# Patient Record
Sex: MALE | Race: OTHER | HISPANIC OR LATINO | URBAN - METROPOLITAN AREA
[De-identification: names, ages, dates, MRNs, and addresses within clinical notes are randomized per-mention and may not be internally consistent; named-entity substitution may affect disease eponyms.]

---

## 2022-05-17 ENCOUNTER — HOSPITAL ENCOUNTER (EMERGENCY)
Facility: OTHER | Age: 47
Discharge: HOME | End: 2022-05-17
Attending: EMERGENCY MEDICINE
Payer: COMMERCIAL

## 2022-05-17 VITALS
TEMPERATURE: 99 F | WEIGHT: 170 LBS | HEART RATE: 56 BPM | HEIGHT: 68 IN | RESPIRATION RATE: 16 BRPM | BODY MASS INDEX: 25.76 KG/M2 | OXYGEN SATURATION: 98 % | DIASTOLIC BLOOD PRESSURE: 77 MMHG | SYSTOLIC BLOOD PRESSURE: 139 MMHG

## 2022-05-17 DIAGNOSIS — N23 RENAL COLIC ON RIGHT SIDE: Primary | ICD-10-CM

## 2022-05-17 DIAGNOSIS — N20.0 KIDNEY STONE: ICD-10-CM

## 2022-05-17 LAB
ALBUMIN SERPL BCP-MCNC: 4.5 G/DL (ref 3.5–5.2)
ALP SERPL-CCNC: 79 U/L (ref 55–135)
ALT SERPL W/O P-5'-P-CCNC: 25 U/L (ref 10–44)
ANION GAP SERPL CALC-SCNC: 12 MMOL/L (ref 8–16)
AST SERPL-CCNC: 18 U/L (ref 10–40)
BASOPHILS # BLD AUTO: 0.05 K/UL (ref 0–0.2)
BASOPHILS NFR BLD: 0.2 % (ref 0–1.9)
BILIRUB SERPL-MCNC: 0.3 MG/DL (ref 0.1–1)
BILIRUB UR QL STRIP: NEGATIVE
BUN SERPL-MCNC: 17 MG/DL (ref 6–20)
CALCIUM SERPL-MCNC: 9.3 MG/DL (ref 8.7–10.5)
CHLORIDE SERPL-SCNC: 103 MMOL/L (ref 95–110)
CLARITY UR: CLEAR
CO2 SERPL-SCNC: 24 MMOL/L (ref 23–29)
COLOR UR: YELLOW
CREAT SERPL-MCNC: 1.3 MG/DL (ref 0.5–1.4)
DIFFERENTIAL METHOD: ABNORMAL
EOSINOPHIL # BLD AUTO: 0 K/UL (ref 0–0.5)
EOSINOPHIL NFR BLD: 0.1 % (ref 0–8)
ERYTHROCYTE [DISTWIDTH] IN BLOOD BY AUTOMATED COUNT: 13 % (ref 11.5–14.5)
EST. GFR  (AFRICAN AMERICAN): >60 ML/MIN/1.73 M^2
EST. GFR  (NON AFRICAN AMERICAN): >60 ML/MIN/1.73 M^2
GLUCOSE SERPL-MCNC: 155 MG/DL (ref 70–110)
GLUCOSE UR QL STRIP: ABNORMAL
HCT VFR BLD AUTO: 43.5 % (ref 40–54)
HGB BLD-MCNC: 14.9 G/DL (ref 14–18)
HGB UR QL STRIP: ABNORMAL
IMM GRANULOCYTES # BLD AUTO: 0.12 K/UL (ref 0–0.04)
IMM GRANULOCYTES NFR BLD AUTO: 0.6 % (ref 0–0.5)
KETONES UR QL STRIP: NEGATIVE
LEUKOCYTE ESTERASE UR QL STRIP: NEGATIVE
LYMPHOCYTES # BLD AUTO: 1.8 K/UL (ref 1–4.8)
LYMPHOCYTES NFR BLD: 8.6 % (ref 18–48)
MCH RBC QN AUTO: 30.9 PG (ref 27–31)
MCHC RBC AUTO-ENTMCNC: 34.3 G/DL (ref 32–36)
MCV RBC AUTO: 90 FL (ref 82–98)
MONOCYTES # BLD AUTO: 1 K/UL (ref 0.3–1)
MONOCYTES NFR BLD: 4.8 % (ref 4–15)
NEUTROPHILS # BLD AUTO: 17.9 K/UL (ref 1.8–7.7)
NEUTROPHILS NFR BLD: 85.7 % (ref 38–73)
NITRITE UR QL STRIP: NEGATIVE
NRBC BLD-RTO: 0 /100 WBC
PH UR STRIP: 6 [PH] (ref 5–8)
PLATELET # BLD AUTO: 275 K/UL (ref 150–450)
PMV BLD AUTO: 9.4 FL (ref 9.2–12.9)
POTASSIUM SERPL-SCNC: 3.6 MMOL/L (ref 3.5–5.1)
PROT SERPL-MCNC: 7.3 G/DL (ref 6–8.4)
PROT UR QL STRIP: ABNORMAL
RBC # BLD AUTO: 4.82 M/UL (ref 4.6–6.2)
SODIUM SERPL-SCNC: 139 MMOL/L (ref 136–145)
SP GR UR STRIP: >=1.03 (ref 1–1.03)
URN SPEC COLLECT METH UR: ABNORMAL
UROBILINOGEN UR STRIP-ACNC: NEGATIVE EU/DL
WBC # BLD AUTO: 20.89 K/UL (ref 3.9–12.7)

## 2022-05-17 PROCEDURE — 96372 THER/PROPH/DIAG INJ SC/IM: CPT | Performed by: EMERGENCY MEDICINE

## 2022-05-17 PROCEDURE — 63600175 PHARM REV CODE 636 W HCPCS: Performed by: EMERGENCY MEDICINE

## 2022-05-17 PROCEDURE — 80053 COMPREHEN METABOLIC PANEL: CPT | Performed by: PHYSICIAN ASSISTANT

## 2022-05-17 PROCEDURE — 85025 COMPLETE CBC W/AUTO DIFF WBC: CPT | Performed by: PHYSICIAN ASSISTANT

## 2022-05-17 PROCEDURE — 99284 EMERGENCY DEPT VISIT MOD MDM: CPT | Mod: 25

## 2022-05-17 PROCEDURE — 81003 URINALYSIS AUTO W/O SCOPE: CPT | Performed by: PHYSICIAN ASSISTANT

## 2022-05-17 RX ORDER — KETOROLAC TROMETHAMINE 30 MG/ML
15 INJECTION, SOLUTION INTRAMUSCULAR; INTRAVENOUS
Status: DISCONTINUED | OUTPATIENT
Start: 2022-05-17 | End: 2022-05-17

## 2022-05-17 RX ORDER — ONDANSETRON 4 MG/1
4 TABLET, ORALLY DISINTEGRATING ORAL EVERY 8 HOURS PRN
Qty: 12 TABLET | Refills: 0 | Status: SHIPPED | OUTPATIENT
Start: 2022-05-17

## 2022-05-17 RX ORDER — KETOROLAC TROMETHAMINE 30 MG/ML
15 INJECTION, SOLUTION INTRAMUSCULAR; INTRAVENOUS
Status: COMPLETED | OUTPATIENT
Start: 2022-05-17 | End: 2022-05-17

## 2022-05-17 RX ORDER — HYDROCODONE BITARTRATE AND ACETAMINOPHEN 5; 325 MG/1; MG/1
1 TABLET ORAL EVERY 6 HOURS PRN
Qty: 12 TABLET | Refills: 0 | Status: SHIPPED | OUTPATIENT
Start: 2022-05-17

## 2022-05-17 RX ADMIN — KETOROLAC TROMETHAMINE 15 MG: 30 INJECTION, SOLUTION INTRAMUSCULAR at 01:05

## 2022-05-17 NOTE — ED TRIAGE NOTES
46 y/o male presents to ED with c/o RL back pain radiating to RL abdomen, describes pain as a pressure. Rating 10/10. Symptoms started this morning.    Sacral nerve stimulator

## 2022-05-17 NOTE — FIRST PROVIDER EVALUATION
Emergency Department TeleTriage Encounter Note      CHIEF COMPLAINT    Chief Complaint   Patient presents with    Back Pain     Pt c.o right lower back pain onset this AM while going to work. Pt states radiates around to abd.  Pt states he felt like he was going to pass out ealier when pain was intense.  AAO x 3 nadn skin cool dry.  No hx of kidney stone       VITAL SIGNS   Initial Vitals [05/17/22 1034]   BP Pulse Resp Temp SpO2   (!) 137/90 (!) 59 18 97.3 °F (36.3 °C) 97 %      MAP       --            ALLERGIES    Review of patient's allergies indicates:  No Known Allergies    PROVIDER TRIAGE NOTE  This is a teletriage evaluation of a 47 y.o. male presenting to the ED complaining of flank pain. Patient reports sudden onset intense right sided back and flank pain this morning. No history of kidney stones. He denies hematuria or dysuria, but reports urgency and frequency with little urine. He denies taking any medications PTA.     Initial orders will be placed and care will be transferred to an alternate provider when patient is roomed for a full evaluation. Any additional orders and the final disposition will be determined by that provider.           ORDERS  Labs Reviewed   CBC W/ AUTO DIFFERENTIAL   COMPREHENSIVE METABOLIC PANEL   URINALYSIS, REFLEX TO URINE CULTURE       ED Orders (720h ago, onward)    Start Ordered     Status Ordering Provider    05/17/22 1045 05/17/22 1041  ketorolac injection 15 mg  ED 1 Time         Ordered USMANSRI G.    05/17/22 1041 05/17/22 1041  Saline lock IV  Once         Ordered USMANSRI G.    05/17/22 1041 05/17/22 1041  CBC auto differential  STAT         Ordered USMAN, SRI G.    05/17/22 1041 05/17/22 1041  Comprehensive metabolic panel  STAT         Ordered USMAN, SRI G.    05/17/22 1041 05/17/22 1041  Urinalysis, Reflex to Urine Culture Urine, Clean Catch  STAT         Ordered USMAN, SRI G.    05/17/22 1041 05/17/22 1041  CT Renal Stone Study ABD Pelvis WO  1 time  imaging         Ordered SRI MARY            Virtual Visit Note: The provider triage portion of this emergency department evaluation and documentation was performed via [employer], a HIPAA-compliant telemedicine application, in concert with a tele-presenter in the room. A face to face patient evaluation with one of my colleagues will occur once the patient is placed in an emergency department room.      DISCLAIMER: This note was prepared with [employer] voice recognition transcription software. Garbled syntax, mangled pronouns, and other bizarre constructions may be attributed to that software system.
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
## 2022-06-07 ENCOUNTER — OFFICE VISIT (OUTPATIENT)
Dept: UROLOGY | Facility: CLINIC | Age: 47
End: 2022-06-07
Payer: COMMERCIAL

## 2022-06-07 DIAGNOSIS — N20.0 KIDNEY STONES: Primary | ICD-10-CM

## 2022-06-07 PROCEDURE — 82365 CALCULUS SPECTROSCOPY: CPT | Performed by: UROLOGY

## 2022-06-07 PROCEDURE — 1159F PR MEDICATION LIST DOCUMENTED IN MEDICAL RECORD: ICD-10-PCS | Mod: CPTII,S$GLB,, | Performed by: UROLOGY

## 2022-06-07 PROCEDURE — 99203 PR OFFICE/OUTPT VISIT, NEW, LEVL III, 30-44 MIN: ICD-10-PCS | Mod: S$GLB,,, | Performed by: UROLOGY

## 2022-06-07 PROCEDURE — 1160F RVW MEDS BY RX/DR IN RCRD: CPT | Mod: CPTII,S$GLB,, | Performed by: UROLOGY

## 2022-06-07 PROCEDURE — 99999 PR PBB SHADOW E&M-EST. PATIENT-LVL III: CPT | Mod: PBBFAC,,, | Performed by: UROLOGY

## 2022-06-07 PROCEDURE — 99999 PR PBB SHADOW E&M-EST. PATIENT-LVL III: ICD-10-PCS | Mod: PBBFAC,,, | Performed by: UROLOGY

## 2022-06-07 PROCEDURE — 1159F MED LIST DOCD IN RCRD: CPT | Mod: CPTII,S$GLB,, | Performed by: UROLOGY

## 2022-06-07 PROCEDURE — 99203 OFFICE O/P NEW LOW 30 MIN: CPT | Mod: S$GLB,,, | Performed by: UROLOGY

## 2022-06-07 PROCEDURE — 1160F PR REVIEW ALL MEDS BY PRESCRIBER/CLIN PHARMACIST DOCUMENTED: ICD-10-PCS | Mod: CPTII,S$GLB,, | Performed by: UROLOGY

## 2022-06-07 NOTE — PROGRESS NOTES
Subjective:      Patient ID: Charlie Cartwright is a 47 y.o. male.    Chief Complaint: kidney stones.    Patient is a 47 y.o. male who is new to our clinic and referred by Murray Pascal NP for evaluation of kidney stones.     HPI    Urolithiasis  Patient complained of right sided flank pain.  Onset 3 weeks ago.  Now resolved.  Passed a kidney stone which he brings in today.   He has no prior history of kidney stones.  n  Urinalysis at the time of ER visit showed no leuks, nitrite neg.     Review of Systems   All other systems reviewed and negative except pertinent positives noted in HPI.    Objective:     Physical Exam  Constitutional:       General: He is not in acute distress.     Appearance: He is well-developed.   HENT:      Head: Normocephalic and atraumatic.   Eyes:      General: No scleral icterus.  Neck:      Trachea: No tracheal deviation.   Pulmonary:      Effort: Pulmonary effort is normal. No respiratory distress.   Neurological:      Mental Status: He is alert and oriented to person, place, and time.   Psychiatric:         Behavior: Behavior normal.         Thought Content: Thought content normal.         Judgment: Judgment normal.       Assessment:     1. Kidney stones      Plan:     1. Kidney stones        Orders Placed This Encounter   Procedures    Urinary Stone Analysis     Order Specific Question:   Specimen Source     Answer:   Stone     1. Kidney stones:  -General risk factors for kidney stones and the conservative measures to prevent kidney stones in the future were discussed with the patient in detail.  The patient was encouraged to drink 2-3 liters of water a day, limit iced tea and violeta as well as foods high in oxalate.  They were cautioned to try to limit salt and red meat intake.  We also discussed adding citrate to the diet with the addition of tenzin or lemon juice to their water or alternatively with crystal light.   -CT scan was independently reviewed today and reveals 4 small right renal  stone, right hydronephrosis, 2mm right UVJ stone.       -the patient was extensively counseled on options for management of his residual nonobstructing right renal stones.  These include observation, shockwave lithotripsy, or ureteroscopy.  I explained that shockwave lithotripsy would not likely makes sense given that he has numerous very small right renal stones.  However, if the patient were interested in a preemptive treatment for these kidney stones, ureteroscopy would be a reasonable option.  He would like to consider his options and will notify us if he decides to proceed.    -stone for chemical analysis ordered today.

## 2022-06-11 LAB
COMPN STONE: NORMAL
SPECIMEN SOURCE: NORMAL
STONE ANALYSIS IR-IMP: NORMAL

## 2024-08-11 ENCOUNTER — HOSPITAL ENCOUNTER (EMERGENCY)
Facility: HOSPITAL | Age: 49
Discharge: HOME | End: 2024-08-11
Attending: EMERGENCY MEDICINE
Payer: COMMERCIAL

## 2024-08-11 VITALS
TEMPERATURE: 98 F | BODY MASS INDEX: 25.61 KG/M2 | HEIGHT: 68 IN | WEIGHT: 169 LBS | HEART RATE: 60 BPM | SYSTOLIC BLOOD PRESSURE: 143 MMHG | RESPIRATION RATE: 16 BRPM | OXYGEN SATURATION: 98 % | DIASTOLIC BLOOD PRESSURE: 88 MMHG

## 2024-08-11 DIAGNOSIS — R31.9 URINARY TRACT INFECTION WITH HEMATURIA, SITE UNSPECIFIED: Primary | ICD-10-CM

## 2024-08-11 DIAGNOSIS — N20.1 RIGHT URETERAL STONE: ICD-10-CM

## 2024-08-11 DIAGNOSIS — N39.0 URINARY TRACT INFECTION WITH HEMATURIA, SITE UNSPECIFIED: Primary | ICD-10-CM

## 2024-08-11 PROBLEM — N20.9 UROLITHIASIS: Status: ACTIVE | Noted: 2024-08-11

## 2024-08-11 LAB
ALBUMIN SERPL BCP-MCNC: 3.6 G/DL (ref 3.5–5.2)
ALP SERPL-CCNC: 114 U/L (ref 55–135)
ALT SERPL W/O P-5'-P-CCNC: 54 U/L (ref 10–44)
ANION GAP SERPL CALC-SCNC: 10 MMOL/L (ref 8–16)
AST SERPL-CCNC: 25 U/L (ref 10–40)
BASOPHILS # BLD AUTO: 0.04 K/UL (ref 0–0.2)
BASOPHILS NFR BLD: 0.5 % (ref 0–1.9)
BILIRUB SERPL-MCNC: 0.3 MG/DL (ref 0.1–1)
BILIRUB UR QL STRIP: NEGATIVE
BUN SERPL-MCNC: 16 MG/DL (ref 6–20)
CALCIUM SERPL-MCNC: 10.1 MG/DL (ref 8.7–10.5)
CHLORIDE SERPL-SCNC: 105 MMOL/L (ref 95–110)
CLARITY UR REFRACT.AUTO: CLEAR
CO2 SERPL-SCNC: 26 MMOL/L (ref 23–29)
COLOR UR AUTO: YELLOW
CREAT SERPL-MCNC: 1 MG/DL (ref 0.5–1.4)
DIFFERENTIAL METHOD BLD: ABNORMAL
EOSINOPHIL # BLD AUTO: 0.1 K/UL (ref 0–0.5)
EOSINOPHIL NFR BLD: 1.1 % (ref 0–8)
ERYTHROCYTE [DISTWIDTH] IN BLOOD BY AUTOMATED COUNT: 12.9 % (ref 11.5–14.5)
EST. GFR  (NO RACE VARIABLE): >60 ML/MIN/1.73 M^2
GLUCOSE SERPL-MCNC: 95 MG/DL (ref 70–110)
GLUCOSE UR QL STRIP: NEGATIVE
HCT VFR BLD AUTO: 41.5 % (ref 40–54)
HCV AB SERPL QL IA: NORMAL
HGB BLD-MCNC: 13.4 G/DL (ref 14–18)
HGB UR QL STRIP: ABNORMAL
HIV 1+2 AB+HIV1 P24 AG SERPL QL IA: NORMAL
IMM GRANULOCYTES # BLD AUTO: 0.05 K/UL (ref 0–0.04)
IMM GRANULOCYTES NFR BLD AUTO: 0.6 % (ref 0–0.5)
KETONES UR QL STRIP: NEGATIVE
LEUKOCYTE ESTERASE UR QL STRIP: ABNORMAL
LYMPHOCYTES # BLD AUTO: 1.6 K/UL (ref 1–4.8)
LYMPHOCYTES NFR BLD: 18.9 % (ref 18–48)
MCH RBC QN AUTO: 28.9 PG (ref 27–31)
MCHC RBC AUTO-ENTMCNC: 32.3 G/DL (ref 32–36)
MCV RBC AUTO: 90 FL (ref 82–98)
MICROSCOPIC COMMENT: ABNORMAL
MONOCYTES # BLD AUTO: 0.7 K/UL (ref 0.3–1)
MONOCYTES NFR BLD: 8.4 % (ref 4–15)
NEUTROPHILS # BLD AUTO: 6 K/UL (ref 1.8–7.7)
NEUTROPHILS NFR BLD: 70.5 % (ref 38–73)
NITRITE UR QL STRIP: NEGATIVE
NRBC BLD-RTO: 0 /100 WBC
PH UR STRIP: 5 [PH] (ref 5–8)
PLATELET # BLD AUTO: 267 K/UL (ref 150–450)
PMV BLD AUTO: 8.8 FL (ref 9.2–12.9)
POTASSIUM SERPL-SCNC: 4.1 MMOL/L (ref 3.5–5.1)
PROT SERPL-MCNC: 7.6 G/DL (ref 6–8.4)
PROT UR QL STRIP: NEGATIVE
RBC # BLD AUTO: 4.63 M/UL (ref 4.6–6.2)
RBC #/AREA URNS AUTO: 16 /HPF (ref 0–4)
SODIUM SERPL-SCNC: 141 MMOL/L (ref 136–145)
SP GR UR STRIP: 1.01 (ref 1–1.03)
URN SPEC COLLECT METH UR: ABNORMAL
WBC # BLD AUTO: 8.54 K/UL (ref 3.9–12.7)
WBC #/AREA URNS AUTO: 61 /HPF (ref 0–5)
WBC CLUMPS UR QL AUTO: ABNORMAL

## 2024-08-11 PROCEDURE — 87389 HIV-1 AG W/HIV-1&-2 AB AG IA: CPT | Performed by: PHYSICIAN ASSISTANT

## 2024-08-11 PROCEDURE — 96365 THER/PROPH/DIAG IV INF INIT: CPT

## 2024-08-11 PROCEDURE — 86803 HEPATITIS C AB TEST: CPT | Performed by: PHYSICIAN ASSISTANT

## 2024-08-11 PROCEDURE — 87086 URINE CULTURE/COLONY COUNT: CPT | Performed by: EMERGENCY MEDICINE

## 2024-08-11 PROCEDURE — 81001 URINALYSIS AUTO W/SCOPE: CPT | Performed by: EMERGENCY MEDICINE

## 2024-08-11 PROCEDURE — 85025 COMPLETE CBC W/AUTO DIFF WBC: CPT | Performed by: EMERGENCY MEDICINE

## 2024-08-11 PROCEDURE — 87186 SC STD MICRODIL/AGAR DIL: CPT | Performed by: EMERGENCY MEDICINE

## 2024-08-11 PROCEDURE — 99284 EMERGENCY DEPT VISIT MOD MDM: CPT | Mod: ,,, | Performed by: UROLOGY

## 2024-08-11 PROCEDURE — 25000003 PHARM REV CODE 250: Performed by: EMERGENCY MEDICINE

## 2024-08-11 PROCEDURE — 80053 COMPREHEN METABOLIC PANEL: CPT | Performed by: EMERGENCY MEDICINE

## 2024-08-11 PROCEDURE — 87088 URINE BACTERIA CULTURE: CPT | Performed by: EMERGENCY MEDICINE

## 2024-08-11 PROCEDURE — 99285 EMERGENCY DEPT VISIT HI MDM: CPT | Mod: 25

## 2024-08-11 PROCEDURE — 63600175 PHARM REV CODE 636 W HCPCS: Performed by: EMERGENCY MEDICINE

## 2024-08-11 RX ORDER — ACETAMINOPHEN 500 MG
1000 TABLET ORAL
Status: COMPLETED | OUTPATIENT
Start: 2024-08-11 | End: 2024-08-11

## 2024-08-11 RX ORDER — OXYCODONE AND ACETAMINOPHEN 5; 325 MG/1; MG/1
1 TABLET ORAL EVERY 6 HOURS PRN
Qty: 12 TABLET | Refills: 0 | Status: SHIPPED | OUTPATIENT
Start: 2024-08-11

## 2024-08-11 RX ORDER — CIPROFLOXACIN 500 MG/1
500 TABLET ORAL 2 TIMES DAILY
Qty: 14 TABLET | Refills: 0 | Status: SHIPPED | OUTPATIENT
Start: 2024-08-11 | End: 2024-08-18

## 2024-08-11 RX ORDER — TAMSULOSIN HYDROCHLORIDE 0.4 MG/1
0.4 CAPSULE ORAL DAILY
Qty: 10 CAPSULE | Refills: 0 | Status: SHIPPED | OUTPATIENT
Start: 2024-08-11 | End: 2025-08-11

## 2024-08-11 RX ADMIN — CEFTRIAXONE SODIUM 2 G: 2 INJECTION, POWDER, FOR SOLUTION INTRAMUSCULAR; INTRAVENOUS at 12:08

## 2024-08-11 RX ADMIN — ACETAMINOPHEN 1000 MG: 500 TABLET ORAL at 11:08

## 2024-08-11 NOTE — HPI
49-year-old male history of kidney stones, presented to the ED with right flank pain for 5 days. CT scan was performed and showed a 3 mm stone at the right UVJ. Urology was consulted for evaluation of ureteral stone.    On assesment the patient is AFVSS. States that the flank pain began 5 days ago. Has had several episodes of intermittent night sweats at home that he attributes to the blankets his wife puts on the bed. Fevers of 101 at home 1 week prior, before the beginning of flank pain. Denies chills, hematuria, nausea, vomiting. Previous passed a calcium oxylate stone in June of 2022.     WBC 8.52, Hg 13.4. Cr 1.0 consistent with baseline. UA nitrite negative, 16 RBC, 61 WBCs and no bacteria.     CT scan showed mild right hydronephrosis with 3 mm lower pole stone, 3 mm stone at the right UVJ. No stones or hydro on the left.

## 2024-08-11 NOTE — CONSULTS
[facility] - Emergency Dept  Urology  Consult Note    Patient Name: Charlie Cartwright  MRN: [id number]  Admission Date: 8/11/2024  Hospital Length of Stay: 0   Code Status: No Order   Attending Provider: Judy Kellogg MD   Consulting Provider: Rafael Lentz DO  Primary Care Physician: No, Primary Doctor  Principal Problem:<principal problem not specified>    Inpatient consult to Urology  Consult performed by: Rafael Lentz DO  Consult ordered by: Judy Kellogg MD  Reason for consult: UVJ stone          Subjective:     HPI:  49-year-old male history of kidney stones, presented to the ED with right flank pain for 5 days. CT scan was performed and showed a 3 mm stone at the right UVJ. Urology was consulted for evaluation of ureteral stone.    On assesment the patient is AFVSS. States that the flank pain began 5 days ago. He does have night sweats at home. Fevers of 101 at home 1 week prior, before the beginning of flank pain. Denies chills, hematuria, nausea, vomiting. Previous passed a calcium oxylate stone in June of 2022.     WBC 8.52, Hg 13.4. Cr 1.0 consistent with baseline. UA nitrite negative, 16 RBC, 61 WBCs and no bacteria.     CT scan showed mild right hydronephrosis with 3 mm lower pole stone, 3 mm stone at the right UVJ. No stones or hydro on the left.     No new subjective & objective note has been filed under this hospital service since the last note was generated.    ROS positive for previous fever and flank pain.       Physical Exam  Constitutional:       General: He is not in acute distress.  Cardiovascular:      Rate and Rhythm: Normal rate.   Pulmonary:      Effort: No respiratory distress.   Abdominal:      Tenderness: There is no right CVA tenderness or left CVA tenderness.          Assessment and Plan:     Urolithiasis  49-year-old male history of kidney stones, presented to the ED with right flank pain for 5 days. CT scan was performed and showed a 3 mm stone at the right UVJ. Urology was  consulted for evaluation of ureteral stone.    -- Patient wishes to proceed with trial of passage  -- Please discharge on Flomax, Toradol, PRN Zofran  -- Please strain all urine.   -- UA non concerning for infection. Will f/u culture results.    -- Will arrange 2 week follow up with [facility] prior to monitor stone  -- Patient given strict return to ED precautions including fevers, chills, uncontrolled nausea, vomiting and pain  -- 1 week of prophylactic Cipro   -- Okay for discharge from Urologic perspective              VTE Risk Mitigation (From admission, onward)      None            Thank you for your consult. I will sign off. Please contact us if you have any additional questions.    Rafael Lentz, DO  Urology  [facility] - Emergency Dept

## 2024-08-11 NOTE — ASSESSMENT & PLAN NOTE
49-year-old male history of kidney stones, presented to the ED with right flank pain for 5 days. CT scan was performed and showed a 3 mm stone at the right UVJ. Urology was consulted for evaluation of ureteral stone.    -- Patient wishes to proceed with trial of passage  -- Please discharge on Flomax, Toradol, PRN Zofran  -- Please strain all urine.   -- UA non concerning for infection.   -- Will give prophylactic Cipro for 1 week   -- Will arrange 2 week follow up with [facility] prior to monitor stone  -- Okay for discharge from Urologic perspective

## 2024-08-11 NOTE — ED TRIAGE NOTES
Pt presents to the ED c/o right sided flank pain since Tuesday, worsening this am. Denies hematuria. Hx kidney stone. +urinary frequency

## 2024-08-11 NOTE — ASSESSMENT & PLAN NOTE
49-year-old male history of kidney stones, presented to the ED with right flank pain for 5 days. CT scan was performed and showed a 3 mm stone at the right UVJ. Urology was consulted for evaluation of ureteral stone.    -- Patient wishes to proceed with trial of passage  -- Please discharge on Flomax, Toradol, PRN Zofran  -- Please strain all urine.   -- UA non concerning for infection.   -- Will arrange 2 week follow up with [facility] prior to monitor stone  -- Okay for discharge from Urologic perspective

## 2024-08-11 NOTE — ED PROVIDER NOTES
Encounter Date: 8/11/2024       History     Chief Complaint   Patient presents with    Flank Pain     R side, hx kidney stones     49-year-old male history of kidney stones, presenting with right flank pain for 5 days.  Pain is nonradiating.  Was more severe earlier in the week and it had improved over the last couple of days and patient thought that he was over it but then today he noticed that his urine was cloudy and was worried that he has an infection.  He has had no fevers or chills but he has had night sweats.  Pain is also still present though mild in his right flank.  It does not radiate to his groin.    The history is provided by the patient.     Review of patient's allergies indicates:   Allergen Reactions    Guaifenesin Other (See Comments)     History reviewed. No pertinent past medical history.  Past Surgical History:   Procedure Laterality Date    HAND SURGERY Right      No family history on file.  Social History     Tobacco Use    Smoking status: Never    Smokeless tobacco: Never   Substance Use Topics    Alcohol use: Never    Drug use: Never     Review of Systems    Physical Exam     Initial Vitals [08/11/24 0924]   BP Pulse Resp Temp SpO2   (!) 143/95 82 18 97.8 °F (36.6 °C) 100 %      MAP       --         Physical Exam    Nursing note and vitals reviewed.  Constitutional: Vital signs are normal. He appears well-developed and well-nourished. He is not diaphoretic.  Non-toxic appearance. He does not appear ill. No distress.   HENT:   Head: Normocephalic and atraumatic.   Mouth/Throat: Mucous membranes are normal. Mucous membranes are not dry.   Eyes: Conjunctivae and lids are normal.   Neck: Neck supple.   Normal range of motion.  Cardiovascular:  Normal rate.           Pulmonary/Chest: No respiratory distress.   Abdominal: Abdomen is soft. He exhibits no distension. There is no abdominal tenderness. There is no rebound and no guarding.   Musculoskeletal:         General: No edema.      Cervical  back: Normal range of motion and neck supple.     Neurological: He is alert and oriented to person, place, and time.   Skin: Skin is dry and intact. No pallor.   Psychiatric: He has a normal mood and affect. His speech is normal and behavior is normal.         ED Course   Procedures  Labs Reviewed   URINALYSIS, REFLEX TO URINE CULTURE - Abnormal       Result Value    Specimen UA Urine, Clean Catch      Color, UA Yellow      Appearance, UA Clear      pH, UA 5.0      Specific Gravity, UA 1.015      Protein, UA Negative      Glucose, UA Negative      Ketones, UA Negative      Bilirubin (UA) Negative      Occult Blood UA Trace (*)     Nitrite, UA Negative      Leukocytes, UA 3+ (*)     Narrative:     Specimen Source->Urine   CBC W/ AUTO DIFFERENTIAL - Abnormal    WBC 8.54      RBC 4.63      Hemoglobin 13.4 (*)     Hematocrit 41.5      MCV 90      MCH 28.9      MCHC 32.3      RDW 12.9      Platelets 267      MPV 8.8 (*)     Immature Granulocytes 0.6 (*)     Gran # (ANC) 6.0      Immature Grans (Abs) 0.05 (*)     Lymph # 1.6      Mono # 0.7      Eos # 0.1      Baso # 0.04      nRBC 0      Gran % 70.5      Lymph % 18.9      Mono % 8.4      Eosinophil % 1.1      Basophil % 0.5      Differential Method Automated     COMPREHENSIVE METABOLIC PANEL - Abnormal    Sodium 141      Potassium 4.1      Chloride 105      CO2 26      Glucose 95      BUN 16      Creatinine 1.0      Calcium 10.1      Total Protein 7.6      Albumin 3.6      Total Bilirubin 0.3      Alkaline Phosphatase 114      AST 25      ALT 54 (*)     eGFR >60.0      Anion Gap 10     URINALYSIS MICROSCOPIC - Abnormal    RBC, UA 16 (*)     WBC, UA 61 (*)     WBC Clumps, UA Rare      Microscopic Comment SEE COMMENT      Narrative:     Specimen Source->Urine   CULTURE, URINE   HIV 1 / 2 ANTIBODY    HIV 1/2 Ag/Ab Non-reactive      Narrative:     Release to patient->Immediate   HEPATITIS C ANTIBODY    Hepatitis C Ab Non-reactive      Narrative:     Release to  patient->Immediate          Imaging Results               CT Renal Stone Study ABD Pelvis WO (Final result)  Result time 08/11/24 11:52:36      Final result by Spenser Sauer MD (08/11/24 11:52:36)                   Impression:      1. Obstructive 3 mm kidney stone at the right UVJ with resultant mild right hydroureteronephrosis and mild periureteral stranding  2. Additional findings as above.  This report was flagged in Epic as abnormal.    These findings were discovered at 11:30 on 08/11/2024 and relayed to Judy Kellogg MD via telephone by Joseph Duncan MD at 11:42 on 08/11/2024.    Electronically signed by resident: Joseph Duncan  Date:    08/11/2024  Time:    11:31    Electronically signed by: Spenser Sauer MD  Date:    08/11/2024  Time:    11:52               Narrative:    EXAMINATION:  CT RENAL STONE STUDY ABD PELVIS WO    CLINICAL HISTORY:  Flank pain, kidney stone suspected;    TECHNIQUE:  Low dose axial images, sagittal and coronal reformations were obtained from the lung bases to the pubic symphysis.  Oral contrast was not administered.    COMPARISON:  CT renal stone study 05/17/2022    FINDINGS:  SOFT TISSUES: Unremarkable.    LUNG BASES/VISUALIZED MEDIASTINUM: Scattered subsegmental atelectasis versus scarring.  Visualized heart is normal size without evidence pericardial effusion.    HEPATOBILIARY: Liver is normal size. Unchanged left hepatic lobe simple cyst and subcentimeter hypodensity, too small to characterize.  Gallbladder is unremarkable.    PANCREAS: No focal masses or ductal dilatation.    SPLEEN: Normal size.    ADRENALS: No adrenal nodules.    KIDNEYS/URETERS: Kidneys are normal size and location.  3 mm stone at the right ureterovesical junction with mild right hydroureteronephrosis.  Subtle soft tissue stranding adjacent to the proximal ureter.  Additional 3 mm stone in the right renal collecting system.  No left-sided stones or hydronephrosis.  No contour deforming  mass.    BLADDER/PELVIC ORGANS: No bladder wall thickening.  Prostate is unremarkable.    PERITONEUM / RETROPERITONEUM: No free air or fluid.    LYMPH NODES: No lymphadenopathy.    VESSELS: Aorta maintains normal course and caliber.  No atherosclerosis.    GI TRACT: Stomach and duodenum are unremarkable.  No evidence of bowel obstruction or inflammation. Normal appendix.  Colonic diverticulosis without evidence of diverticulitis.    BONES: Osseous degenerative changes of the thoracic spine.  No acute fractures or suspicious osseous lesions.                                       Medications   acetaminophen tablet 1,000 mg (1,000 mg Oral Given 8/11/24 1124)   cefTRIAXone (ROCEPHIN) 2 g in D5W 100 mL IVPB (MB+) (0 g Intravenous Stopped 8/11/24 1312)     Medical Decision Making  DDx for flank pain would include ureteral stone with hydronephrosis, pyelonephritis, retroperitoneal hemorrhage, aortic dissection, biliary colic, musculoskeletal back pain, vertebral compression fracture, lower lobe pneumonia and pre-zoster syndrome.      Plan  -labs  -tylenol  -CT abd/pelvis    Amount and/or Complexity of Data Reviewed  External Data Reviewed: notes.  Labs: ordered. Decision-making details documented in ED Course.  Radiology: ordered and independent interpretation performed. Decision-making details documented in ED Course.  Discussion of management or test interpretation with external provider(s): Urology resident    Risk  OTC drugs.  Prescription drug management.  Decision regarding hospitalization.               ED Course as of 08/11/24 1611   Sun Aug 11, 2024   1144 Urinalysis concerning for an infection.  CT consistent with a right UVJ stone that is causing an obstruction.  Given this, concern for infected stone.  Ceftriaxone 2 g IV ordered.  I have discussed the case with the urologist on-call who will come evaluate the patient. [AS]   1408 Anticipated admission for a ureteral stent but urology has evaluated the patient  and feel that patient would be safe for discharge home with antibiotics and strict ED return precautions.  I have already given him antibiotics here in the ED.  Will discharge home with Cipro per their recommendations. [AS]      ED Course User Index  [AS] Judy Kellogg MD                           Clinical Impression:  Final diagnoses:  [N39.0, R31.9] Urinary tract infection with hematuria, site unspecified (Primary)  [N20.1] Right ureteral stone          ED Disposition Condition    Discharge Stable          ED Prescriptions       Medication Sig Dispense Start Date End Date Auth. Provider    ciprofloxacin HCl (CIPRO) 500 MG tablet Take 1 tablet (500 mg total) by mouth 2 (two) times daily. for 7 days 14 tablet 8/11/2024 8/18/2024 Judy Kellogg MD    tamsulosin (FLOMAX) 0.4 mg Cap Take 1 capsule (0.4 mg total) by mouth once daily. 10 capsule 8/11/2024 8/11/2025 Judy Kellogg MD    oxyCODONE-acetaminophen (PERCOCET) 5-325 mg per tablet Take 1 tablet by mouth every 6 (six) hours as needed. 12 tablet 8/11/2024 -- Judy Kellogg MD          Follow-up Information       Follow up With Specialties Details Why Contact Info    [facility] Emergency Medicine  If symptoms worsen [id number]  [facility] [address] [phone]             Judy Kellogg MD  08/11/24 5701

## 2024-08-12 ENCOUNTER — TELEPHONE (OUTPATIENT)
Dept: EMERGENCY MEDICINE | Facility: HOSPITAL | Age: 49
End: 2024-08-12
Payer: COMMERCIAL

## 2024-08-12 NOTE — TELEPHONE ENCOUNTER
Patient was seen in the ED for an obstructive ureteral stone, urine culture growing Gram-negative rods.  He was given ceftriaxone in the ED and discharged with Cipro.  I called and spoke with him, he states that he passed the stone after leaving the ED and is now completely pain-free.  Given that he passed the stone, I do not think that he needs to return to the ER for stent placement.  Patient instructed to continue antibiotics as directed and follow up with Urology.  All questions answered.

## 2024-08-13 ENCOUNTER — TELEPHONE (OUTPATIENT)
Dept: UROLOGY | Facility: HOSPITAL | Age: 49
End: 2024-08-13
Payer: COMMERCIAL

## 2024-08-13 ENCOUNTER — PATIENT MESSAGE (OUTPATIENT)
Dept: UROLOGY | Facility: CLINIC | Age: 49
End: 2024-08-13
Payer: COMMERCIAL

## 2024-08-13 DIAGNOSIS — N20.1 CALCULUS OF URETER: Primary | ICD-10-CM

## 2024-08-13 LAB — BACTERIA UR CULT: ABNORMAL

## 2024-08-13 RX ORDER — NITROFURANTOIN 25; 75 MG/1; MG/1
100 CAPSULE ORAL 2 TIMES DAILY
Qty: 28 CAPSULE | Refills: 0 | Status: SHIPPED | OUTPATIENT
Start: 2024-08-13 | End: 2024-08-13

## 2024-08-13 NOTE — TELEPHONE ENCOUNTER
"Urology Progress Note    Urine culture results and was resistant to Cipro. Patient passed stone, denies further fevers, chills. Sensitive to Macrobid    -- Sent in 2 weeks of Macrobid.  -- Maintain outpatient follow up, please bring stone for analysis       Please call with further questions or concerns.    Rafael Lentz (Teddy), DO  PGY-2, [facility]      "
13-Oct-2021

## 2024-08-14 RX ORDER — NITROFURANTOIN 25; 75 MG/1; MG/1
100 CAPSULE ORAL 2 TIMES DAILY
Qty: 28 CAPSULE | Refills: 0 | Status: SHIPPED | OUTPATIENT
Start: 2024-08-14 | End: 2024-08-28

## 2024-08-15 ENCOUNTER — TELEPHONE (OUTPATIENT)
Dept: UROLOGY | Facility: CLINIC | Age: 49
End: 2024-08-15
Payer: COMMERCIAL

## 2024-08-15 DIAGNOSIS — N20.1 CALCULUS OF URETER: ICD-10-CM

## 2024-08-15 RX ORDER — NITROFURANTOIN 25; 75 MG/1; MG/1
100 CAPSULE ORAL 2 TIMES DAILY
Qty: 28 CAPSULE | Refills: 0 | Status: CANCELLED | OUTPATIENT
Start: 2024-08-15 | End: 2024-08-29

## 2024-08-15 NOTE — TELEPHONE ENCOUNTER
----- Message from Pina Pappas MA sent at 8/15/2024  3:26 PM CDT -----  [facility] is asking for a verbal for the abx for this pt

## 2024-08-15 NOTE — TELEPHONE ENCOUNTER
Called pt in regards to message regarding antibiotics not being at pharmacy. Pt states that he has been trying to get his antibiotics from [facility] and they are telling him they have no RX for him. They did tell the pt they could take a verbal over the phone. Message has been sent to Dr Villasenor's nurse.

## 2024-10-17 ENCOUNTER — PATIENT MESSAGE (OUTPATIENT)
Dept: RESEARCH | Facility: HOSPITAL | Age: 49
End: 2024-10-17
Payer: COMMERCIAL

## 2024-11-12 ENCOUNTER — OFFICE VISIT (OUTPATIENT)
Dept: UROLOGY | Facility: CLINIC | Age: 49
End: 2024-11-12
Payer: COMMERCIAL

## 2024-11-12 ENCOUNTER — HOSPITAL ENCOUNTER (OUTPATIENT)
Dept: RADIOLOGY | Facility: HOSPITAL | Age: 49
Discharge: HOME | End: 2024-11-12
Payer: COMMERCIAL

## 2024-11-12 VITALS
HEART RATE: 57 BPM | DIASTOLIC BLOOD PRESSURE: 87 MMHG | SYSTOLIC BLOOD PRESSURE: 134 MMHG | HEIGHT: 68 IN | WEIGHT: 174.69 LBS | BODY MASS INDEX: 26.48 KG/M2

## 2024-11-12 DIAGNOSIS — N20.0 CALCIUM OXALATE KIDNEY STONES: Primary | ICD-10-CM

## 2024-11-12 DIAGNOSIS — N20.1 RIGHT URETERAL STONE: ICD-10-CM

## 2024-11-12 DIAGNOSIS — N22 CALCULUS OF URINARY TRACT IN DISEASES CLASSIFIED ELSEWHERE: ICD-10-CM

## 2024-11-12 PROCEDURE — 99999 PR PBB SHADOW E&M-EST. PATIENT-LVL III: CPT | Mod: PBBFAC,,, | Performed by: UROLOGY

## 2024-11-12 PROCEDURE — 74176 CT ABD & PELVIS W/O CONTRAST: CPT | Mod: TC

## 2024-11-12 PROCEDURE — 74176 CT ABD & PELVIS W/O CONTRAST: CPT | Mod: 26,,, | Performed by: RADIOLOGY
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
## 2025-04-10 ENCOUNTER — HOSPITAL ENCOUNTER (OUTPATIENT)
Dept: RADIOLOGY | Facility: HOSPITAL | Age: 50
Discharge: HOME | End: 2025-04-10
Attending: UROLOGY
Payer: COMMERCIAL

## 2025-04-10 DIAGNOSIS — N20.0 CALCIUM OXALATE KIDNEY STONES: ICD-10-CM

## 2025-04-10 PROCEDURE — 74018 RADEX ABDOMEN 1 VIEW: CPT | Mod: 26,,, | Performed by: RADIOLOGY

## 2025-04-10 PROCEDURE — 74018 RADEX ABDOMEN 1 VIEW: CPT | Mod: TC,PN

## 2025-04-11 ENCOUNTER — TELEPHONE (OUTPATIENT)
Dept: ENDOSCOPY | Facility: HOSPITAL | Age: 50
End: 2025-04-11
Payer: COMMERCIAL

## 2025-04-11 ENCOUNTER — OFFICE VISIT (OUTPATIENT)
Dept: PRIMARY CARE CLINIC | Facility: CLINIC | Age: 50
End: 2025-04-11
Payer: COMMERCIAL

## 2025-04-11 VITALS
HEIGHT: 68 IN | WEIGHT: 179.25 LBS | HEART RATE: 57 BPM | DIASTOLIC BLOOD PRESSURE: 86 MMHG | BODY MASS INDEX: 27.17 KG/M2 | OXYGEN SATURATION: 97 % | SYSTOLIC BLOOD PRESSURE: 136 MMHG

## 2025-04-11 DIAGNOSIS — Z00.00 ANNUAL PHYSICAL EXAM: Primary | ICD-10-CM

## 2025-04-11 DIAGNOSIS — Z12.11 COLON CANCER SCREENING: ICD-10-CM

## 2025-04-11 DIAGNOSIS — R31.21 ASYMPTOMATIC MICROSCOPIC HEMATURIA: ICD-10-CM

## 2025-04-11 DIAGNOSIS — Z12.11 COLON CANCER SCREENING: Primary | ICD-10-CM

## 2025-04-11 DIAGNOSIS — Z12.5 PROSTATE CANCER SCREENING: ICD-10-CM

## 2025-04-11 LAB
BILIRUB UR QL STRIP.AUTO: NEGATIVE
CLARITY UR: CLEAR
COLOR UR AUTO: YELLOW
GLUCOSE UR QL STRIP: NEGATIVE
HGB UR QL STRIP: NEGATIVE
KETONES UR QL STRIP: NEGATIVE
LEUKOCYTE ESTERASE UR QL STRIP: NEGATIVE
NITRITE UR QL STRIP: NEGATIVE
PH UR STRIP: 6 [PH]
PROT UR QL STRIP: NEGATIVE
SP GR UR STRIP: 1.03
UROBILINOGEN UR STRIP-ACNC: NEGATIVE EU/DL

## 2025-04-11 PROCEDURE — 3044F HG A1C LEVEL LT 7.0%: CPT | Mod: CPTII,S$GLB,, | Performed by: STUDENT IN AN ORGANIZED HEALTH CARE EDUCATION/TRAINING PROGRAM

## 2025-04-11 PROCEDURE — 3008F BODY MASS INDEX DOCD: CPT | Mod: CPTII,S$GLB,, | Performed by: STUDENT IN AN ORGANIZED HEALTH CARE EDUCATION/TRAINING PROGRAM

## 2025-04-11 PROCEDURE — 99386 PREV VISIT NEW AGE 40-64: CPT | Mod: S$GLB,,, | Performed by: STUDENT IN AN ORGANIZED HEALTH CARE EDUCATION/TRAINING PROGRAM

## 2025-04-11 PROCEDURE — 81003 URINALYSIS AUTO W/O SCOPE: CPT | Performed by: STUDENT IN AN ORGANIZED HEALTH CARE EDUCATION/TRAINING PROGRAM

## 2025-04-11 PROCEDURE — 99999 PR PBB SHADOW E&M-EST. PATIENT-LVL IV: CPT | Mod: PBBFAC,,, | Performed by: STUDENT IN AN ORGANIZED HEALTH CARE EDUCATION/TRAINING PROGRAM

## 2025-04-11 PROCEDURE — 3079F DIAST BP 80-89 MM HG: CPT | Mod: CPTII,S$GLB,, | Performed by: STUDENT IN AN ORGANIZED HEALTH CARE EDUCATION/TRAINING PROGRAM

## 2025-04-11 PROCEDURE — 3075F SYST BP GE 130 - 139MM HG: CPT | Mod: CPTII,S$GLB,, | Performed by: STUDENT IN AN ORGANIZED HEALTH CARE EDUCATION/TRAINING PROGRAM

## 2025-04-11 PROCEDURE — 1159F MED LIST DOCD IN RCRD: CPT | Mod: CPTII,S$GLB,, | Performed by: STUDENT IN AN ORGANIZED HEALTH CARE EDUCATION/TRAINING PROGRAM

## 2025-04-11 PROCEDURE — 1160F RVW MEDS BY RX/DR IN RCRD: CPT | Mod: CPTII,S$GLB,, | Performed by: STUDENT IN AN ORGANIZED HEALTH CARE EDUCATION/TRAINING PROGRAM

## 2025-04-11 RX ORDER — POLYETHYLENE GLYCOL 3350, SODIUM SULFATE ANHYDROUS, SODIUM BICARBONATE, SODIUM CHLORIDE, POTASSIUM CHLORIDE 236; 22.74; 6.74; 5.86; 2.97 G/4L; G/4L; G/4L; G/4L; G/4L
4 POWDER, FOR SOLUTION ORAL ONCE
Qty: 4000 ML | Refills: 0 | Status: SHIPPED | OUTPATIENT
Start: 2025-04-11 | End: 2025-04-11

## 2025-04-11 NOTE — PROGRESS NOTES
"Office visit  Patient: Charlie Cartwright   4/11/2025       Assessment/Plan:       1. Annual physical exam  -     TSH; Future; Expected date: 04/11/2025  -     Hemoglobin A1C; Future; Expected date: 04/11/2025  -     Lipid Panel; Future; Expected date: 04/11/2025  -     Comprehensive Metabolic Panel; Future; Expected date: 04/11/2025  -     CBC Auto Differential; Future; Expected date: 04/11/2025        -Discussed healthy habits and recommended preventative screening    2. Asymptomatic microscopic hematuria  -     Urinalysis, Reflex to Urine Culture  -     GREY TOP URINE HOLD    3. Colon cancer screening  -     Ambulatory referral/consult to Endo Procedure ; Future; Expected date: 04/12/2025    4. Prostate cancer screening  -     PSA, Screening; Future; Expected date: 04/11/2025      Return to clinic in 1 year or sooner as needed.          CHIEF COMPLAINT: annual physical    HPI: Charlie Cartwright is a 50 y.o. male who presents for an annual physical. He has no complaints.    He eats out for lunch every day.  He does not drink soda.  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      Review of Systems   Constitutional:  Negative for chills, fever and malaise/fatigue.   HENT:  Negative for congestion, ear discharge, ear pain and sore throat.    Eyes:  Negative for pain and discharge.   Respiratory:  Negative for cough and shortness of breath.    Cardiovascular:  Negative for chest pain and palpitations.   Gastrointestinal:  Negative for abdominal pain, constipation, diarrhea, nausea and vomiting.   Genitourinary:  Negative for dysuria, frequency and hematuria.   Musculoskeletal:  Positive for back pain (lower right back). Negative for joint pain and myalgias.   Skin:  Negative for rash.   Neurological:  Negative for dizziness, seizures and headaches.       No current outpatient medications    Lab Results   Component Value Date    HGBA1C 5.2 04/11/2025     No results found for: "MICALBCREAT"  Lab Results   Component " "Value Date    CHOL 238 (H) 04/11/2025    HDL 54 04/11/2025    TRIG 91 04/11/2025       Lab Results   Component Value Date     04/11/2025    K 4.2 04/11/2025     04/11/2025    CO2 28 04/11/2025    GLU 95 08/11/2024    BUN 19 04/11/2025    CREATININE 1.0 04/11/2025    CALCIUM 9.5 04/11/2025    PROT 7.6 08/11/2024    ALBUMIN 4.3 04/11/2025    BILITOT 0.4 04/11/2025    ALKPHOS 79 04/11/2025    AST 20 04/11/2025    ALT 28 04/11/2025    ANIONGAP 5 (L) 04/11/2025    ESTGFRAFRICA >60 05/17/2022    EGFRNONAA >60 05/17/2022    WBC 5.96 04/11/2025    HGB 14.3 04/11/2025    HGB 13.4 (L) 08/11/2024    HCT 43.8 04/11/2025    MCV 91 04/11/2025     04/11/2025    TSH 1.459 04/11/2025    PSA 0.37 04/11/2025    HEPCAB Non-reactive 08/11/2024       No results found for: "LH", "FSH", "TOTALTESTOST", "PROGESTERONE", "ESTRADIOL", "[id number]", "BANLWMFT23", "FERRITIN", "IRON", "TRANSFERRIN", "TIBC", "FESATURATED", "ZINC"      History reviewed. No pertinent past medical history.  Past Surgical History:   Procedure Laterality Date    FRACTURE SURGERY  2010    HAND SURGERY Right        Social History[1]    family history includes Dementia (age of onset: 70 - 79) in his paternal grandfather; Other in his father.    Vitals:    04/11/25 1059 04/11/25 1136   BP: (!) 134/96 136/86   Pulse: (!) 57    SpO2: 97%    Weight: 81.3 kg (179 lb 3.7 oz)    Height: 5' 8" (1.727 m)    PainSc: 0-No pain        Body mass index is 27.25 kg/m².      Objective:   Physical Exam  Vitals reviewed.   Constitutional:       General: He is not in acute distress.     Appearance: Normal appearance. He is well-developed.   HENT:      Head: Normocephalic and atraumatic.      Right Ear: Hearing and external ear normal. No decreased hearing noted. No drainage or swelling.      Left Ear: Hearing and external ear normal. No decreased hearing noted. No drainage or swelling.      Nose: Nose normal. No rhinorrhea.      Mouth/Throat:      Mouth: Mucous " membranes are moist.   Eyes:      General: Lids are normal.         Right eye: No discharge.         Left eye: No discharge.      Conjunctiva/sclera: Conjunctivae normal.      Right eye: Right conjunctiva is not injected. No exudate.     Left eye: Left conjunctiva is not injected. No exudate.  Cardiovascular:      Rate and Rhythm: Normal rate and regular rhythm.      Heart sounds: Normal heart sounds. No murmur heard.     No friction rub. No gallop.   Pulmonary:      Effort: Pulmonary effort is normal. No respiratory distress.      Breath sounds: No stridor. No wheezing, rhonchi or rales.   Musculoskeletal:         General: No deformity.      Right lower leg: No edema.      Left lower leg: No edema.   Skin:     General: Skin is warm and dry.   Neurological:      General: No focal deficit present.      Mental Status: He is alert and oriented to person, place, and time.   Psychiatric:         Mood and Affect: Mood normal.         Speech: Speech normal.         Behavior: Behavior normal.             Karo Tom MD  Internal Medicine and Pediatrics                                 [1]   Social History  Tobacco Use    Smoking status: Never    Smokeless tobacco: Never   Substance Use Topics    Alcohol use: Never    Drug use: Never

## 2025-04-14 ENCOUNTER — RESULTS FOLLOW-UP (OUTPATIENT)
Dept: PRIMARY CARE CLINIC | Facility: CLINIC | Age: 50
End: 2025-04-14

## 2025-05-13 ENCOUNTER — TELEPHONE (OUTPATIENT)
Dept: ENDOSCOPY | Facility: HOSPITAL | Age: 50
End: 2025-05-13
Payer: COMMERCIAL

## 2025-05-13 NOTE — TELEPHONE ENCOUNTER
----- Message from Cathy sent at 5/13/2025 10:05 AM CDT -----  Regarding: FW: Reschedule Existing Appointment  Contact: [phone]    ----- Message -----  From: Rosemarie Adams MA  Sent: 5/13/2025   9:33 AM CDT  To: [facility] End[facility] Schedulers  Subject: FW: Reschedule Existing Appointment                ----- Message -----  From: Akash Spencer  Sent: 5/13/2025   9:28 AM CDT  To: [facility] Staff  Subject: Reschedule Existing Appointment                  Reschedule Existing Appointment Current appt date: 5/17/2025 Type of appt : Colonoscopy screening Physician: Dr. English Reason for rescheduling: Wants to reschedule for the following Saturday. Caller: Yaritza Contact Preference: [phone]

## 2025-05-13 NOTE — TELEPHONE ENCOUNTER
Patient is rescheduled for a Colonoscopy on 7/26/25 with CAROLYN English. Referral for procedure from [facility] referral - see appointments tab.

## 2025-07-18 ENCOUNTER — ANESTHESIA EVENT (OUTPATIENT)
Dept: ENDOSCOPY | Facility: HOSPITAL | Age: 50
End: 2025-07-18
Payer: COMMERCIAL

## 2025-07-18 NOTE — ANESTHESIA PREPROCEDURE EVALUATION
07/18/2025  Charlie Cartwright is a 50 y.o., male.    [facility]  Anesthesia Pre-Operative Evaluation       Patient Name: Charlie Cartwright  YOB: 1975  MRN: [id number]  CSN: [id number]      Code Status: No Order   Date of Procedure: 7/26/2025  Anesthesia: Choice Procedure: Procedure(s) (LRB):  COLONOSCOPY, SCREENING, LOW RISK PATIENT (N/A)  Pre-Operative Diagnosis: Colon cancer screening [Z12.11]  Proceduralist: Surgeons and Role:     * Jonny English MD - Primary        SUBJECTIVE:   Charlie Cartwright is a 50 y.o. male who  has no past medical history on file..     he is not on any long-term medications.     ALLERGIES:     Review of patient's allergies indicates:   Allergen Reactions    Guaifenesin Other (See Comments)     LDA:          Lines/Drains/Airways       None                  Anesthesia Evaluation     Patient summary reviewed and Nursing notes reviewed    No history of anesthetic complications   Airway   Mallampati: I  TM distance: Normal  Dental      Pulmonary    Cardiovascular     Neuro/Psych      GI/Hepatic/Renal    (+) chronic renal disease (kidney stone)    Endo/Other    Abdominal                     MEDICATIONS:     Antibiotics (From admission, onward)      None          VTE Risk Mitigation (From admission, onward)      None              Current Medications[1]       History:   There are no hospital problems to display for this patient.    Surgical History:    has a past surgical history that includes Hand surgery (Right) and Fracture surgery (2010).   Social History:    reports being sexually active and has had partner(s) who are female. He reports using the following method of birth control/protection: None.  reports that he has never smoked. He has never used smokeless tobacco. He reports that he does not drink alcohol and does not use drugs.     OBJECTIVE:     Vital Signs (Most  "Recent):    Vital Signs Range (Last 24H):  BP: ()/()   Arterial Line BP: ()/()        There is no height or weight on file to calculate BMI.   Wt Readings from Last 4 Encounters:   04/11/25 81.3 kg (179 lb 3.7 oz)   11/12/24 79.2 kg (174 lb 11.2 oz)   08/11/24 76.7 kg (169 lb)   06/07/22 (P) 76.7 kg (169 lb)       Significant Labs:  Lab Results   Component Value Date    WBC 5.96 04/11/2025    HGB 14.3 04/11/2025    HCT 43.8 04/11/2025     04/11/2025     04/11/2025    K 4.2 04/11/2025     04/11/2025    CREATININE 1.0 04/11/2025    BUN 19 04/11/2025    CO2 28 04/11/2025    GLU 92 04/11/2025    CALCIUM 9.5 04/11/2025    ALKPHOS 79 04/11/2025    ALT 28 04/11/2025    AST 20 04/11/2025    ALBUMIN 4.3 04/11/2025    HGBA1C 5.2 04/11/2025     No LMP for male patient.  No results found for this or any previous visit (from the past 72 hours).    EKG:   No results found for this or any previous visit.    TTE:  No results found for this or any previous visit.  No results found for: "EF"   No results found for this or any previous visit.  IRENE:  No results found for this or any previous visit.  Stress Test:  No results found for this or any previous visit.     LHC:  No results found for this or any previous visit.     PFT:  No results found for: "FEV1", "FVC", "LGW9YTS", "TLC", "DLCO"     ASSESSMENT/PLAN:      Pre-op Assessment    I have reviewed the Patient Summary Reports.     I have reviewed the Nursing Notes. I have reviewed the NPO Status.   I have reviewed the Medications.     Review of Systems  Anesthesia Hx:  No problems with previous Anesthesia Denies Hx of Anesthetic complications            Denies Family Hx of Anesthesia complications.    Denies Personal Hx of Anesthesia complications.                    Hematology/Oncology:  Hematology Normal   Oncology Normal                                   EENT/Dental:  EENT/Dental Normal           Cardiovascular:  Cardiovascular Normal                            "                   Pulmonary:  Pulmonary Normal                       Renal/:  Chronic Renal Disease (kidney stone) renal calculi               Hepatic/GI:  Hepatic/GI Normal                    Musculoskeletal:  Musculoskeletal Normal                Neurological:  Neurology Normal                                      Endocrine:  Endocrine Normal            Dermatological:  Skin Normal    Psych:  Psychiatric Normal                    Physical Exam  General: Well nourished    Airway:  Mallampati: I / I  Mouth Opening: Normal  TM Distance: Normal  Tongue: Normal        Anesthesia Plan  Type of Anesthesia, risks & benefits discussed:    Anesthesia Type: Gen Natural Airway  Intra-op Monitoring Plan: Standard ASA Monitors  Post Op Pain Control Plan: multimodal analgesia  Induction:  IV  Informed Consent: Informed consent signed with the Patient and all parties understand the risks and agree with anesthesia plan.  All questions answered.   ASA Score: 1  Day of Surgery Review of History & Physical: H&P Update referred to the surgeon/provider.    Ready For Surgery From Anesthesia Perspective.     .           [1]   No current facility-administered medications for this encounter.     No current outpatient medications on file.

## 2025-07-26 ENCOUNTER — ANESTHESIA (OUTPATIENT)
Dept: ENDOSCOPY | Facility: HOSPITAL | Age: 50
End: 2025-07-26
Payer: COMMERCIAL

## 2025-07-26 ENCOUNTER — HOSPITAL ENCOUNTER (OUTPATIENT)
Facility: HOSPITAL | Age: 50
Discharge: HOME | End: 2025-07-26
Attending: STUDENT IN AN ORGANIZED HEALTH CARE EDUCATION/TRAINING PROGRAM | Admitting: STUDENT IN AN ORGANIZED HEALTH CARE EDUCATION/TRAINING PROGRAM
Payer: COMMERCIAL

## 2025-07-26 VITALS
RESPIRATION RATE: 16 BRPM | OXYGEN SATURATION: 97 % | TEMPERATURE: 98 F | DIASTOLIC BLOOD PRESSURE: 70 MMHG | HEART RATE: 68 BPM | SYSTOLIC BLOOD PRESSURE: 109 MMHG

## 2025-07-26 DIAGNOSIS — Z12.11 COLON CANCER SCREENING: ICD-10-CM

## 2025-07-26 DIAGNOSIS — Z12.11 ENCOUNTER FOR SCREENING COLONOSCOPY: Primary | ICD-10-CM

## 2025-07-26 PROCEDURE — 45385 COLONOSCOPY W/LESION REMOVAL: CPT | Mod: 33,,, | Performed by: STUDENT IN AN ORGANIZED HEALTH CARE EDUCATION/TRAINING PROGRAM

## 2025-07-26 PROCEDURE — 88305 TISSUE EXAM BY PATHOLOGIST: CPT | Mod: TC | Performed by: STUDENT IN AN ORGANIZED HEALTH CARE EDUCATION/TRAINING PROGRAM

## 2025-07-26 PROCEDURE — 45385 COLONOSCOPY W/LESION REMOVAL: CPT | Mod: 33 | Performed by: STUDENT IN AN ORGANIZED HEALTH CARE EDUCATION/TRAINING PROGRAM

## 2025-07-26 PROCEDURE — 27201089 HC SNARE, DISP (ANY): Performed by: STUDENT IN AN ORGANIZED HEALTH CARE EDUCATION/TRAINING PROGRAM

## 2025-07-26 PROCEDURE — 25000003 PHARM REV CODE 250: Performed by: NURSE ANESTHETIST, CERTIFIED REGISTERED

## 2025-07-26 PROCEDURE — 25000003 PHARM REV CODE 250: Performed by: STUDENT IN AN ORGANIZED HEALTH CARE EDUCATION/TRAINING PROGRAM

## 2025-07-26 PROCEDURE — 37000009 HC ANESTHESIA EA ADD 15 MINS: Performed by: STUDENT IN AN ORGANIZED HEALTH CARE EDUCATION/TRAINING PROGRAM

## 2025-07-26 PROCEDURE — 88305 TISSUE EXAM BY PATHOLOGIST: CPT | Mod: 26,,, | Performed by: PATHOLOGY

## 2025-07-26 PROCEDURE — 37000008 HC ANESTHESIA 1ST 15 MINUTES: Performed by: STUDENT IN AN ORGANIZED HEALTH CARE EDUCATION/TRAINING PROGRAM

## 2025-07-26 PROCEDURE — 63600175 PHARM REV CODE 636 W HCPCS: Performed by: NURSE ANESTHETIST, CERTIFIED REGISTERED

## 2025-07-26 RX ORDER — DEXMEDETOMIDINE HYDROCHLORIDE 100 UG/ML
INJECTION, SOLUTION INTRAVENOUS
Status: DISCONTINUED | OUTPATIENT
Start: 2025-07-26 | End: 2025-07-26

## 2025-07-26 RX ORDER — LIDOCAINE HYDROCHLORIDE 20 MG/ML
INJECTION INTRAVENOUS
Status: DISCONTINUED | OUTPATIENT
Start: 2025-07-26 | End: 2025-07-26

## 2025-07-26 RX ORDER — SODIUM CHLORIDE 9 MG/ML
INJECTION, SOLUTION INTRAVENOUS CONTINUOUS
Status: DISCONTINUED | OUTPATIENT
Start: 2025-07-26 | End: 2025-07-26 | Stop reason: HOSPADM

## 2025-07-26 RX ORDER — PROPOFOL 10 MG/ML
VIAL (ML) INTRAVENOUS CONTINUOUS PRN
Status: DISCONTINUED | OUTPATIENT
Start: 2025-07-26 | End: 2025-07-26

## 2025-07-26 RX ORDER — PROPOFOL 10 MG/ML
VIAL (ML) INTRAVENOUS
Status: DISCONTINUED | OUTPATIENT
Start: 2025-07-26 | End: 2025-07-26

## 2025-07-26 RX ADMIN — DEXMEDETOMIDINE HYDROCHLORIDE 12 MCG: 100 INJECTION, SOLUTION INTRAVENOUS at 07:07

## 2025-07-26 RX ADMIN — PROPOFOL 100 MG: 10 INJECTION, EMULSION INTRAVENOUS at 07:07

## 2025-07-26 RX ADMIN — SODIUM CHLORIDE: 0.9 INJECTION, SOLUTION INTRAVENOUS at 06:07

## 2025-07-26 RX ADMIN — PROPOFOL 150 MCG/KG/MIN: 10 INJECTION, EMULSION INTRAVENOUS at 07:07

## 2025-07-26 RX ADMIN — LIDOCAINE HYDROCHLORIDE 50 MG: 20 INJECTION INTRAVENOUS at 07:07

## 2025-07-26 NOTE — H&P
Short Stay Endoscopy History and Physical    PCP - Karo Tom MD    Procedure - Colonoscopy  ASA - per anesthesia  Mallampati - per anesthesia  History of Anesthesia problems - no  Family history Anesthesia problems - no   Plan of anesthesia - per anesthesia    HPI:  This is a 50 y.o. male here for evaluation of : asymptomatic screening exam      ROS:  Constitutional: No fevers, chills, No weight loss  CV: No chest pain  Pulm: No cough, No shortness of breath  GI: see HPI  Derm: No rash    Medical History:  has no past medical history on file.    Surgical History:  has a past surgical history that includes Hand surgery (Right) and Fracture surgery (2010).    Family History: family history includes Dementia (age of onset: 70 - 79) in his paternal grandfather; Other in his father.. Otherwise no colon cancer, inflammatory bowel disease, or GI malignancies.    Social History:  reports that he has never smoked. He has never used smokeless tobacco. He reports that he does not drink alcohol and does not use drugs.    Review of patient's allergies indicates:   Allergen Reactions    Guaifenesin Other (See Comments)       Medications:   Prescriptions Prior to Admission[1]      Physical Exam:    Vital Signs:   Vitals:    07/26/25 0648   BP: (!) 169/90   Pulse: 68   Resp: 18   Temp: 97.5 °F (36.4 °C)       General Appearance: Well appearing, no acute distress  Eyes:    No scleral icterus  ENT: Neck supple, Lips, mucosa, and tongue normal; teeth and gums normal  Lungs: Pulmonary effort is normal. No respiratory distress.   Heart:  Normal rate, regular rhythm  Abdomen: Soft, non tender, non distended.  Extremities: 2+ pulses, no clubbing, cyanosis or edema  Skin: No rash      Labs:  Lab Results   Component Value Date    WBC 5.96 04/11/2025    HGB 14.3 04/11/2025    HCT 43.8 04/11/2025     04/11/2025    CHOL 238 (H) 04/11/2025    TRIG 91 04/11/2025    HDL 54 04/11/2025    ALT 28 04/11/2025    AST 20  04/11/2025     04/11/2025    K 4.2 04/11/2025     04/11/2025    CREATININE 1.0 04/11/2025    BUN 19 04/11/2025    CO2 28 04/11/2025    TSH 1.459 04/11/2025    PSA 0.37 04/11/2025    HGBA1C 5.2 04/11/2025       I have explained the risks and benefits of endoscopy procedures to the patient including but not limited to bleeding, perforation, infection, and death.  The patient was asked if they understand and allowed to ask any further questions to their satisfaction.    Jonny English MD         [1]   No medications prior to admission.

## 2025-07-26 NOTE — PROVATION PATIENT INSTRUCTIONS
Discharge Summary/Instructions after an Endoscopic Procedure  Patient Name: Charlie Cartwright  Patient MRN: [id number]  Patient YOB: 1975 Saturday, July 26, 2025  Jonny English MD  Dear patient,  As a result of recent federal legislation (The Federal Cures Act), you may   receive lab or pathology results from your procedure in your [facility]   account before your physician is able to contact you. Your physician or   their representative will relay the results to you with their   recommendations at their soonest availability.  Thank you,  RESTRICTIONS:  During your procedure today, you received medications for sedation.  These   medications may affect your judgment, balance and coordination.  Therefore,   for 24 hours, you have the following restrictions:   - DO NOT drive a car, operate machinery, make legal/financial decisions,   sign important papers or drink alcohol.    ACTIVITY:  Today: no heavy lifting, straining or running due to procedural   sedation/anesthesia.  The following day: return to full activity including work.  DIET:  Eat and drink normally unless instructed otherwise.     TREATMENT FOR COMMON SIDE EFFECTS:  - Mild abdominal pain, nausea, belching, bloating or excessive gas:  rest,   eat lightly and use a heating pad.  - Sore Throat: treat with throat lozenges and/or gargle with warm salt   water.  - Because air was used during the procedure, expelling large amounts of air   from your rectum or belching is normal.  - If a bowel prep was taken, you may not have a bowel movement for 1-3 days.    This is normal.  SYMPTOMS TO WATCH FOR AND REPORT TO YOUR PHYSICIAN:  1. Abdominal pain or bloating, other than gas cramps.  2. Chest pain.  3. Back pain.  4. Signs of infection such as: chills or fever occurring within 24 hours   after the procedure.  5. Rectal bleeding, which would show as bright red, maroon, or black stools.   (A tablespoon of blood from the rectum is not serious, especially if    hemorrhoids are present.)  6. Vomiting.  7. Weakness or dizziness.  GO DIRECTLY TO THE NEAREST EMERGENCY ROOM IF YOU HAVE ANY OF THE FOLLOWING:      Difficulty breathing              Chills and/or fever over 101 F   Persistent vomiting and/or vomiting blood   Severe abdominal pain   Severe chest pain   Black, tarry stools   Bleeding- more than one tablespoon   Any other symptom or condition that you feel may need urgent attention  Your doctor recommends these additional instructions:  If any biopsies were taken, your doctors clinic will contact you in 1 to 2   weeks with any results.  - The patient will be observed post-procedure, until all discharge criteria   are met.   - Discharge patient to home.   - Resume previous diet.   - Continue present medications.   - Patient has a contact number available for emergencies.  The signs and   symptoms of potential delayed complications were discussed with the   patient.  Return to normal activities tomorrow.  Written discharge   instructions were provided to the patient.   - Repeat colonoscopy in 5 years for surveillance.  For questions, problems or results please call your physician - Jonny English MD at Work:  [phone], Work:  [phone].  [facility], EMERGENCY ROOM PHONE NUMBER: [phone]  IF A COMPLICATION OR EMERGENCY SITUATION ARISES AND YOU ARE UNABLE TO REACH   YOUR PHYSICIAN - GO DIRECTLY TO THE EMERGENCY ROOM.  MD Jonny Beltrán MD  7/26/2025 8:08:46 AM  This report has been verified and signed electronically.  Dear patient,  As a result of recent federal legislation (The Federal Cures Act), you may   receive lab or pathology results from your procedure in your [facility]   account before your physician is able to contact you. Your physician or   their representative will relay the results to you with their   recommendations at their soonest availability.  Thank you,  [employer]ATION

## 2025-07-26 NOTE — PLAN OF CARE
Pt in preop bay 16, VSS, and IV inserted. Pt denies any open wounds on body or the use of any weight loss injections. Pt needs admit order, procedural consents signed, H&P, H&P updated, otherwise ready to roll.

## 2025-07-26 NOTE — TRANSFER OF CARE
Anesthesia Transfer of Care Note    Patient: Charlie Cartwright    Procedure(s) Performed: Procedure(s) (LRB):  COLONOSCOPY, SCREENING, LOW RISK PATIENT (N/A)    Patient location: PACU    Anesthesia Type: general    Transport from OR: Transported from OR on 6-10 L/min O2 by face mask with adequate spontaneous ventilation    Post pain: adequate analgesia    Post assessment: no apparent anesthetic complications    Post vital signs: stable    Level of consciousness: sedated    Nausea/Vomiting: no nausea/vomiting    Complications: none    Transfer of care protocol was followed      Last vitals: Visit Vitals  BP (!) 169/90 (BP Location: Left arm, Patient Position: Lying)   Pulse 68   Temp 36.4 °C (97.5 °F) (Temporal)   Resp 18   SpO2 100%

## 2025-07-26 NOTE — ANESTHESIA POSTPROCEDURE EVALUATION
Anesthesia Post Evaluation    Patient: Charlie Cartwright    Procedure(s) Performed: Procedure(s) (LRB):  COLONOSCOPY, SCREENING, LOW RISK PATIENT (N/A)    Final Anesthesia Type: general      Patient location during evaluation: PACU  Patient participation: Yes- Able to Participate  Level of consciousness: awake and alert  Post-procedure vital signs: reviewed and stable  Pain management: adequate  Airway patency: patent  ANGELIA mitigation strategies: Intraoperative administration of CPAP, nasopharyngeal airway, or oral appliance during sedation  PONV status at discharge: No PONV  Anesthetic complications: no      Cardiovascular status: blood pressure returned to baseline  Respiratory status: unassisted  Hydration status: euvolemic  Follow-up not needed.              Vitals Value Taken Time   /55 07/26/25 08:15   Temp 36.4 °C (97.6 °F) 07/26/25 08:00   Pulse 79 07/26/25 08:15   Resp 16 07/26/25 08:15   SpO2 99 % 07/26/25 08:15         No case tracking events are documented in the log.      Pain/Isaac Score: Isaac Score: 9 (7/26/2025  8:15 AM)

## 2025-07-29 LAB
ESTROGEN SERPL-MCNC: NORMAL PG/ML
INSULIN SERPL-ACNC: NORMAL U[IU]/ML
LAB AP CLINICAL INFORMATION: NORMAL
LAB AP GROSS DESCRIPTION: NORMAL
LAB AP PERFORMING LOCATION(S): NORMAL
LAB AP REPORT FOOTNOTES: NORMAL